# Patient Record
Sex: MALE | Race: OTHER | Employment: FULL TIME | URBAN - METROPOLITAN AREA
[De-identification: names, ages, dates, MRNs, and addresses within clinical notes are randomized per-mention and may not be internally consistent; named-entity substitution may affect disease eponyms.]

---

## 2017-07-26 ENCOUNTER — HOSPITAL ENCOUNTER (OUTPATIENT)
Dept: RADIOLOGY | Facility: HOSPITAL | Age: 47
Discharge: HOME | End: 2017-07-26
Payer: COMMERCIAL

## 2017-07-26 ENCOUNTER — TRANSCRIBE ORDERS (OUTPATIENT)
Dept: ADMINISTRATIVE | Facility: HOSPITAL | Age: 47
End: 2017-07-26

## 2017-07-26 DIAGNOSIS — Z11.1 SCREENING EXAMINATION FOR PULMONARY TUBERCULOSIS: ICD-10-CM

## 2017-07-26 DIAGNOSIS — Z11.1 SCREENING EXAMINATION FOR PULMONARY TUBERCULOSIS: Primary | ICD-10-CM

## 2017-07-26 PROCEDURE — 71020 HB CHEST X-RAY 2VW FRONTAL&LATL: CPT

## 2020-11-09 ENCOUNTER — TELEMEDICINE (OUTPATIENT)
Dept: FAMILY MEDICINE CLINIC | Facility: CLINIC | Age: 50
End: 2020-11-09

## 2020-11-09 ENCOUNTER — TELEPHONE (OUTPATIENT)
Dept: FAMILY MEDICINE CLINIC | Facility: CLINIC | Age: 50
End: 2020-11-09

## 2020-11-09 VITALS — BODY MASS INDEX: 25.76 KG/M2 | WEIGHT: 170 LBS | HEIGHT: 68 IN

## 2020-11-09 DIAGNOSIS — J06.9 UPPER RESPIRATORY TRACT INFECTION, UNSPECIFIED TYPE: Primary | ICD-10-CM

## 2020-11-09 DIAGNOSIS — R50.81 FEVER IN OTHER DISEASES: ICD-10-CM

## 2020-11-09 DIAGNOSIS — R05.8 COUGH PRODUCTIVE OF PURULENT SPUTUM: ICD-10-CM

## 2020-11-09 PROCEDURE — 99213 OFFICE O/P EST LOW 20 MIN: CPT | Performed by: NURSE PRACTITIONER

## 2020-11-09 RX ORDER — LISINOPRIL 5 MG/1
5 TABLET ORAL DAILY
COMMUNITY

## 2020-11-09 RX ORDER — PREDNISONE 20 MG/1
20 TABLET ORAL DAILY
Qty: 5 TABLET | Refills: 0 | Status: SHIPPED | OUTPATIENT
Start: 2020-11-09 | End: 2020-11-14

## 2020-11-09 RX ORDER — GLIMEPIRIDE 4 MG/1
4 TABLET ORAL
COMMUNITY

## 2020-11-09 RX ORDER — AZITHROMYCIN 250 MG/1
TABLET, FILM COATED ORAL
Qty: 6 TABLET | Refills: 0 | Status: SHIPPED | OUTPATIENT
Start: 2020-11-09 | End: 2020-11-14

## 2020-11-10 PROBLEM — J06.9 UPPER RESPIRATORY TRACT INFECTION: Status: ACTIVE | Noted: 2020-11-10

## 2020-11-10 PROBLEM — R50.9 FEVER: Status: ACTIVE | Noted: 2020-11-10

## 2020-11-10 PROBLEM — R05.8 COUGH PRODUCTIVE OF PURULENT SPUTUM: Status: ACTIVE | Noted: 2020-11-10

## 2020-11-12 ENCOUNTER — TELEMEDICINE (OUTPATIENT)
Dept: FAMILY MEDICINE CLINIC | Facility: CLINIC | Age: 50
End: 2020-11-12
Payer: COMMERCIAL

## 2020-11-12 VITALS — WEIGHT: 170 LBS | HEIGHT: 68 IN | BODY MASS INDEX: 25.76 KG/M2

## 2020-11-12 DIAGNOSIS — U07.1 LAB TEST POSITIVE FOR DETECTION OF COVID-19 VIRUS: Primary | ICD-10-CM

## 2020-11-12 PROCEDURE — 3725F SCREEN DEPRESSION PERFORMED: CPT | Performed by: NURSE PRACTITIONER

## 2020-11-12 PROCEDURE — 3008F BODY MASS INDEX DOCD: CPT | Performed by: NURSE PRACTITIONER

## 2020-11-12 PROCEDURE — 1036F TOBACCO NON-USER: CPT | Performed by: NURSE PRACTITIONER

## 2020-11-12 PROCEDURE — 99213 OFFICE O/P EST LOW 20 MIN: CPT | Performed by: NURSE PRACTITIONER

## 2020-11-30 ENCOUNTER — TELEPHONE (OUTPATIENT)
Dept: FAMILY MEDICINE CLINIC | Facility: CLINIC | Age: 50
End: 2020-11-30

## 2021-05-23 ENCOUNTER — IMMUNIZATIONS (OUTPATIENT)
Dept: FAMILY MEDICINE CLINIC | Facility: HOSPITAL | Age: 51
End: 2021-05-23

## 2021-05-23 DIAGNOSIS — Z23 ENCOUNTER FOR IMMUNIZATION: Primary | ICD-10-CM

## 2021-05-23 PROCEDURE — 0001A SARS-COV-2 / COVID-19 MRNA VACCINE (PFIZER-BIONTECH) 30 MCG: CPT

## 2021-05-23 PROCEDURE — 91300 SARS-COV-2 / COVID-19 MRNA VACCINE (PFIZER-BIONTECH) 30 MCG: CPT

## 2021-06-01 ENCOUNTER — OFFICE VISIT (OUTPATIENT)
Dept: DENTISTRY | Facility: CLINIC | Age: 51
End: 2021-06-01

## 2021-06-01 VITALS
DIASTOLIC BLOOD PRESSURE: 91 MMHG | WEIGHT: 171 LBS | TEMPERATURE: 97.7 F | BODY MASS INDEX: 26 KG/M2 | HEART RATE: 74 BPM | SYSTOLIC BLOOD PRESSURE: 164 MMHG

## 2021-06-01 DIAGNOSIS — K01.1 TOOTH IMPACTION: ICD-10-CM

## 2021-06-01 DIAGNOSIS — Z01.20 ENCOUNTER FOR DENTAL EXAMINATION: Primary | ICD-10-CM

## 2021-06-01 PROCEDURE — D0274 BITEWINGS - 4 RADIOGRAPHIC IMAGES: HCPCS

## 2021-06-01 PROCEDURE — D0150 COMPREHENSIVE ORAL EVALUATION - NEW OR ESTABLISHED PATIENT: HCPCS | Performed by: DENTIST

## 2021-06-01 PROCEDURE — D1110 PROPHYLAXIS - ADULT: HCPCS

## 2021-06-01 NOTE — PROGRESS NOTES
Patient presents for Comp Exam    Medical history updated in patient electronic medical record-  Parent denies any recent exposures for the family to coronavirus positive individuals, negative fever, negative sore throat, negative coughing, negative loss of taste or smell, no diarrhea or GI issues reported  High speed evacuation, N95 masks, face shield use, and other preventative measures utilized to prevent the spread of COVID-19  Pt hand sanitized, used Chlorhexidine pre procedural rinse, used RELEAF, external suction and High speed vacuum for polishing  RECALL EXAM, ADULT PROPHY,  4 BWX     CHIEF CONCERN: pt reports no pain today but pain previously in LR + LL areas  Pt indicating wisdom teeth area ( #17/32)  PAIN SCALE: 0  ASA CLASS: I  PLAQUE: moderate  CALCULUS: mod calculus  BLEEDING: heavy  STAIN : light   ORAL HYGIENE:  poor  PERIO: spot probed  Most 2-3 mm  Isolated 4 mm present  Hand scaled, polished and flossed  Used cavitron    Oral Hygiene Instruction :  recommended brushing 2 x daily for 2 minutes MIN, recommended flossing daily, reviewed dietary precautions  soft tissue evaluation  Stressed importance of 6 month recall  Informed pt that gingivitis present and gums in poor condition today  Stressed importance of daily interproximal cleaning  Soft tissue exam:  soft tissue exam was normal  ExtraOral exam:   Extraoral exam was normal    Dr Yoshi Dukes exam=   Reviewed with patient clinical and radiographic findings and parent verbalized understanding  All questions and concerns addressed   recommended referral to [facility] for extraction #17/32  Both teeth are horizontally impacted with #32 showing previous infection and #17 contributing to decay on #18    REFERRALS: [facility] referral provided for ext #17 + 32    CARIES FINDINGS: #18-DO ( unable to access until #17 removed)    Next Visit: #18 -DO after #17 extracted by [facility]  Next Recall: 6 month recall- recheck probings * watch gums*

## 2021-06-12 ENCOUNTER — IMMUNIZATIONS (OUTPATIENT)
Dept: FAMILY MEDICINE CLINIC | Facility: HOSPITAL | Age: 51
End: 2021-06-12

## 2021-06-12 DIAGNOSIS — Z23 ENCOUNTER FOR IMMUNIZATION: Primary | ICD-10-CM

## 2021-06-12 PROCEDURE — 0002A SARS-COV-2 / COVID-19 MRNA VACCINE (PFIZER-BIONTECH) 30 MCG: CPT

## 2021-06-12 PROCEDURE — 91300 SARS-COV-2 / COVID-19 MRNA VACCINE (PFIZER-BIONTECH) 30 MCG: CPT

## 2021-12-06 ENCOUNTER — CLINICAL SUPPORT (OUTPATIENT)
Dept: DENTISTRY | Facility: CLINIC | Age: 51
End: 2021-12-06

## 2021-12-06 VITALS — TEMPERATURE: 97.9 F | HEART RATE: 82 BPM | DIASTOLIC BLOOD PRESSURE: 90 MMHG | SYSTOLIC BLOOD PRESSURE: 157 MMHG

## 2021-12-06 DIAGNOSIS — Z01.20 ENCOUNTER FOR DENTAL EXAMINATION: Primary | ICD-10-CM

## 2021-12-06 PROCEDURE — D0120 PERIODIC ORAL EVALUATION - ESTABLISHED PATIENT: HCPCS | Performed by: DENTIST

## 2021-12-06 PROCEDURE — D0220 INTRAORAL - PERIAPICAL FIRST RADIOGRAPHIC IMAGE: HCPCS

## 2022-04-08 ENCOUNTER — NURSE TRIAGE (OUTPATIENT)
Dept: OTHER | Facility: OTHER | Age: 52
End: 2022-04-08

## 2022-04-08 NOTE — TELEPHONE ENCOUNTER
Regarding: Teeth extracted mouth swollen and in pain  ----- Message from Tim Calvillo sent at 4/8/2022  3:12 PM EDT -----  '' I had my wisdom teeth extracted on 3/25 on the right side of my mouth is swollen, I'm having a lot of pain  ''

## 2022-04-08 NOTE — TELEPHONE ENCOUNTER
Reason for Disposition   [1] Tooth extraction > 3 days ago AND [2] pain not improving    Answer Assessment - Initial Assessment Questions  1  SYMPTOM: "What's the main symptom you're concerned about?" (e g , bleeding, pain, fever)      Pain on right bottom extraction site  Unable to open mouth wide or chew on right side  2  ONSET: "When did the s/s  start?"      3/25    3  DATE of TOOTH EXTRACTION: "When was surgery performed?"       3/25  Pt prescribed amoxicillin post op and has completed course  4  BLEEDING: "Is there any bleeding?" If Yes, ask: "How much?"       Denies    5  PAIN: "Is there any pain?" If Yes, ask: "How bad is it?"  (Scale 1-10; or mild, moderate, severe)      5, motrin 600mg helps but pain is never gone  6  FEVER: "Do you have a fever?" If Yes, ask: "What is your temperature, how was it measured, and when did it start?"      Denies    7   OTHER SYMPTOMS: "Do you have any other symptoms?" (e g , face swelling)      Denies    Protocols used: TOOTH EXTRACTION-ADULT-

## 2022-05-17 NOTE — PROGRESS NOTES
Adult prophy   Pt presents for sc/rp - pt previously tx planned for sc/rp all 4 quads  Pt had several questions re: sc/rp which took up 30 minutes of scheduled time  Discussed insurance 80% covers sc/rp quads and pt responsibility 20%  Pt's portion today was to be 50 66  After discussion, pt requests only prophy today  Explained that if sc/rp not performed, perio condition will progress and future bone loss/tooth loss  Pt is not a smoker but is diabetic  Explained correlation with periodontal disease  Also explained if prophy done today, insurance may not cover sc/rp in future  Pt requests only prophy today  Performed prophy today per pt request and Pt signature on perio sc/rp refusal form  Refusal form signed by patient and  to scan to pt's chart  CHIEF CONCERN: none   PAIN SCALE: 0  ASA CLASS: I  PLAQUE: moderate  CALCULUS: heavy  BLEEDING: heavy-informed pt this is a sign that gums unhealthy and infection present  STAIN :light   ORAL HYGIENE:  poor -stressed importance of daily flossing  Pt reports he is flossing sometimes  PERIO: early perio present  Previous visit full probing completed with deeper pockets present  Hand scaled, polished and flossed  Used cavitron    Oral Hygiene Instruction:  recommended brushing 2 x daily for 2 minutes MIN, recommended flossing daily, reviewed dietary precautions  Informed pt no spitting or heavy lifting remainder of day due to excessive bleeding today  Soft tissue exam:  soft tissue exam was normal  ExtraOral exam:   Extraoral exam was normal    REFERRALS: no referrals needed    Next Visit: filling   NV: 6 month recall- re evaluate gums

## 2022-05-18 ENCOUNTER — OFFICE VISIT (OUTPATIENT)
Dept: DENTISTRY | Facility: CLINIC | Age: 52
End: 2022-05-18

## 2022-05-18 VITALS — TEMPERATURE: 97.8 F | HEART RATE: 97 BPM | DIASTOLIC BLOOD PRESSURE: 84 MMHG | SYSTOLIC BLOOD PRESSURE: 147 MMHG

## 2022-05-18 DIAGNOSIS — Z01.20 ENCOUNTER FOR DENTAL EXAMINATION: Primary | ICD-10-CM

## 2022-05-18 PROCEDURE — D1110 PROPHYLAXIS - ADULT: HCPCS

## 2022-12-19 ENCOUNTER — OFFICE VISIT (OUTPATIENT)
Dept: DENTISTRY | Facility: CLINIC | Age: 52
End: 2022-12-19

## 2022-12-19 VITALS — SYSTOLIC BLOOD PRESSURE: 169 MMHG | DIASTOLIC BLOOD PRESSURE: 91 MMHG

## 2022-12-19 DIAGNOSIS — Z01.20 ENCOUNTER FOR DENTAL EXAM AND CLEANING W/O ABNORMAL FINDINGS: Primary | ICD-10-CM

## 2022-12-19 NOTE — PROGRESS NOTES
RECALL EXAM, 4 bwx, probe exam    Pt's last dental visit was 5/18/22  Sc/rp was recommended but refused by patient  REVIEWED MED HX: meds, allergies, health changes reviewed in EPIC  CHIEF CONCERN:  none   PAIN SCALE:  0  ASA CLASS:  II  PERIO: full probe exam completed by Dr Gaby Jameson and Tactile Intraoral/ Extraoral evaluation: Oral and Oropharyngeal cancer evaluation  No findings     Dr Jayson Pope exam=   Reviewed with patient clinical and radiographic findings and patient verbalized understanding  All questions and concerns addressed  Dr Jayson Pope informed pt the periodontal disease is present and although it is not severe at this point, if recommended sc/rp is not completed, bone loss will progress and patient has the potential to lose teeth  Dr also discussed periodontal disease correlation with his Diabetes  Dr discussed sliding fee scale  Pt reports insurance will cover, it is just difficult to get an appt with his work schedule  Discussed later appts with our other 2 facilities  REFERRALS: no referrals needed- pt reports both LR + LL wisdom teeth removed  CARIES FINDINGS: #19 -DO       TREATMENT  PLAN :   1) sc/rp UR/ LR--->70-75 minutes requested  Pt requests only 2 visits to complete due to difficult work schedule  2) sc/rp UL/LL---> 70-75 minutes requested     3) #19 DO    Next Recall: 6 month recall     Last BWX: 12/19/22

## 2023-11-30 ENCOUNTER — OFFICE VISIT (OUTPATIENT)
Dept: DENTISTRY | Facility: CLINIC | Age: 53
End: 2023-11-30

## 2023-11-30 VITALS — SYSTOLIC BLOOD PRESSURE: 137 MMHG | DIASTOLIC BLOOD PRESSURE: 95 MMHG

## 2023-11-30 DIAGNOSIS — K05.4 PERIODONTOSIS: Primary | ICD-10-CM

## 2023-11-30 PROCEDURE — D4341 PERIODONTAL SCALING AND ROOT PLANING - 4 OR MORE TEETH PER QUADRANT: HCPCS

## 2023-11-30 NOTE — DENTAL PROCEDURE DETAILS
Pt arrives first time to [facility] since 12/19/22 for SRP apt. Requested two quads done today since insurance approval stops 12/23. We were able to accommodate him today  Reviewed Medical History  ASA II      Scaling and Root Planing: UR,LR  Applied topical gel 20% Benzocaine  Administered 1.5 carpules Septocaine short UR, LR 1.7mL Infiltrated quadrant & Lidocaine IANB long LR negative aspiration  Pt. tolerated well  Hand and ultrasonic Scaled and root planned  Post subgingival irrigation with Chlorhexidine  Post op instructions given no chewing, nothing hot until numbness subsides and no heavy lifting or frequent bending over next 24 hrs due to heavy bldg. Pt understands instructions. Electric tb strongly recommended  Heavy general. Supra and subgingival plaque, bldg. And calculus is present    NV: SRP UL full quad  SRP LL full quad-told pt may not have time to do both quads one visit  Needs: still needs rest done  Periodic exam due -decided with resident Kitty Rivas that we will do periodic exam after 12/20/23 when xrays are done/due. Liberty Carrera, WEN., PHDHP.

## 2023-12-24 ENCOUNTER — OFFICE VISIT (OUTPATIENT)
Dept: URGENT CARE | Facility: CLINIC | Age: 53
End: 2023-12-24
Payer: COMMERCIAL

## 2023-12-24 VITALS
OXYGEN SATURATION: 97 % | RESPIRATION RATE: 16 BRPM | HEART RATE: 105 BPM | BODY MASS INDEX: 26.52 KG/M2 | SYSTOLIC BLOOD PRESSURE: 133 MMHG | HEIGHT: 68 IN | DIASTOLIC BLOOD PRESSURE: 76 MMHG | WEIGHT: 175 LBS | TEMPERATURE: 99.2 F

## 2023-12-24 DIAGNOSIS — J06.9 ACUTE URI: Primary | ICD-10-CM

## 2023-12-24 PROCEDURE — 99213 OFFICE O/P EST LOW 20 MIN: CPT | Performed by: NURSE PRACTITIONER

## 2023-12-24 RX ORDER — AMOXICILLIN AND CLAVULANATE POTASSIUM 875; 125 MG/1; MG/1
1 TABLET, FILM COATED ORAL EVERY 12 HOURS SCHEDULED
Qty: 20 TABLET | Refills: 0 | Status: SHIPPED | OUTPATIENT
Start: 2023-12-24 | End: 2024-01-03

## 2023-12-24 RX ORDER — ALBUTEROL SULFATE 90 UG/1
2 AEROSOL, METERED RESPIRATORY (INHALATION) EVERY 6 HOURS PRN
Qty: 8.5 G | Refills: 0 | Status: SHIPPED | OUTPATIENT
Start: 2023-12-24

## 2023-12-24 NOTE — PROGRESS NOTES
[facility]        NAME: Calderon Cruz is a 53 y.o. male  : 1970    MRN: [id number]  DATE: 2023  TIME: 1:42 PM    Assessment and Plan   Acute URI [J06.9]  1. Acute URI  amoxicillin-clavulanate (AUGMENTIN) 875-125 mg per tablet    albuterol (ProAir HFA) 90 mcg/act inhaler            Patient Instructions       Follow up with PCP in 3-5 days.  Proceed to  ER if symptoms worsen.    Chief Complaint     Chief Complaint   Patient presents with    URI     URI s/s x 5 days with cough and congestion with fever.          History of Present Illness       Patient is a 53-year-old male presenting with 5 days of cough and fever.  Cough is moist and productive with sputum.  Fever resolved last night and has not returned.  Denies sore throat or ear pain.  He is using Advil, Tylenol, NyQuil, and Robitussin with no improvement.  No home COVID testing completed.  He states that a friend was staying at their house who is also sick.    URI   Associated symptoms include coughing and wheezing. Pertinent negatives include no abdominal pain, congestion, diarrhea, ear pain, headaches, nausea, rash, rhinorrhea, sinus pain, sore throat or vomiting.       Review of Systems   Review of Systems   Constitutional:  Positive for fever. Negative for activity change, chills and fatigue.   HENT:  Negative for congestion, ear pain, rhinorrhea, sinus pain and sore throat.    Respiratory:  Positive for cough, shortness of breath and wheezing.    Gastrointestinal:  Negative for abdominal pain, diarrhea, nausea and vomiting.   Musculoskeletal:  Negative for myalgias.   Skin:  Negative for rash.   Neurological:  Negative for headaches.         Current Medications       Current Outpatient Medications:     albuterol (ProAir HFA) 90 mcg/act inhaler, Inhale 2 puffs every 6 (six) hours as needed for wheezing, Disp: 8.5 g, Rfl: 0    amoxicillin-clavulanate (AUGMENTIN) 875-125 mg per tablet, Take 1 tablet by mouth every 12 (twelve)  "hours for 10 days, Disp: 20 tablet, Rfl: 0    glimepiride (AMARYL) 4 mg tablet, Take 4 mg by mouth every morning before breakfast, Disp: , Rfl:     lisinopril (ZESTRIL) 5 mg tablet, Take 5 mg by mouth daily, Disp: , Rfl:     metFORMIN (GLUCOPHAGE) 500 mg tablet, Take 500 mg by mouth 2 (two) times a day with meals, Disp: , Rfl:     Current Allergies     Allergies as of 12/24/2023    (No Known Allergies)            The following portions of the patient's history were reviewed and updated as appropriate: allergies, current medications, past family history, past medical history, past social history, past surgical history and problem list.     Past Medical History:   Diagnosis Date    Diabetes mellitus (HCC)     Hypertension        Past Surgical History:   Procedure Laterality Date    WISDOM TOOTH EXTRACTION         Family History   Problem Relation Age of Onset    Diabetes Mother          Medications have been verified.        Objective   /76   Pulse 105   Temp 99.2 °F (37.3 °C)   Resp 16   Ht 5' 8\" (1.727 m)   Wt 79.4 kg (175 lb)   SpO2 97%   BMI 26.61 kg/m²        Physical Exam     Physical Exam  Vitals reviewed.   Constitutional:       General: He is awake. He is not in acute distress.     Appearance: Normal appearance. He is normal weight.   HENT:      Head: Normocephalic.      Right Ear: Hearing, tympanic membrane, ear canal and external ear normal.      Left Ear: Hearing, tympanic membrane, ear canal and external ear normal.      Nose: Nose normal.      Mouth/Throat:      Lips: Pink.      Pharynx: Oropharynx is clear.   Cardiovascular:      Rate and Rhythm: Regular rhythm. Tachycardia present.      Heart sounds: Normal heart sounds, S1 normal and S2 normal.   Pulmonary:      Effort: Pulmonary effort is normal.      Breath sounds: Examination of the left-upper field reveals rhonchi. Examination of the left-middle field reveals rhonchi. Rhonchi present. No decreased breath sounds, wheezing or rales. "   Skin:     General: Skin is warm and moist.   Neurological:      General: No focal deficit present.      Mental Status: He is alert, oriented to person, place, and time and easily aroused.   Psychiatric:         Behavior: Behavior is cooperative.

## 2024-01-30 ENCOUNTER — OFFICE VISIT (OUTPATIENT)
Dept: DENTISTRY | Facility: CLINIC | Age: 54
End: 2024-01-30

## 2024-01-30 VITALS — SYSTOLIC BLOOD PRESSURE: 150 MMHG | HEART RATE: 80 BPM | DIASTOLIC BLOOD PRESSURE: 90 MMHG

## 2024-01-30 DIAGNOSIS — K05.30 PERIODONTITIS: Primary | ICD-10-CM

## 2024-01-30 PROCEDURE — D4341 PERIODONTAL SCALING AND ROOT PLANING - 4 OR MORE TEETH PER QUADRANT: HCPCS

## 2024-01-30 NOTE — PROGRESS NOTES
SRP in quads :UL/LL     CC: Patient notices movement in #20 recently. #20 Grade 1 MOB w/ lingual tilt and deeper pocketing.     Reviewed meds/hhx in Epic  ASA: 2. BP higher than normal. Patient states he  forgot to take his bp pill this morning and was rushing to make this appointment.     Anesthesia-  Topical gel benzocaine 20% was applied to tissue.  2 carps 4% Septo w/ epi 1:100,000 was given via short needle.  Type of injection:buccal infiltration  Neg aspirations and no complications for all.     Cavitron and hand instruments used  Bleeding: heavy  Supra/sub calculus was removed from tooth surfaces  Irrigated w/ .12% Chlorhexidine Gluconate    OHI: reviewed with the patient the need to maintain proper oral hygiene regimen at home. Brushing 2x/day for 2 minutes, flossing subgingivally daily and mouthrinse 1-2x/day for 60 seconds. Recommend otc pain reliever when the patient gets home prior to local anesthesia wearing off. Recommend warm salt water rinses. Recommend excellent homecare regimen especially on areas post SRP.    Advised patient periodontal disease is an oral disease we can treat and maintain but cannot cure. Without proper dental visits and proper at home dental care this disease may return. Patient understands.     Following all scaling and root planing, pt will return for 12 weeks perio maint. If healing is sufficient, the patient will then continue on 3 month periodontal maintenance appointments. If healing is insufficient, pt may need to be assessed for gingival flap surgery.      NV: 3mos Perio Mt, Ex, 4BWX

## 2024-02-21 PROBLEM — R05.8 COUGH PRODUCTIVE OF PURULENT SPUTUM: Status: RESOLVED | Noted: 2020-11-10 | Resolved: 2024-02-21

## 2024-02-21 PROBLEM — R50.9 FEVER: Status: RESOLVED | Noted: 2020-11-10 | Resolved: 2024-02-21

## 2024-04-27 ENCOUNTER — OFFICE VISIT (OUTPATIENT)
Dept: URGENT CARE | Facility: CLINIC | Age: 54
End: 2024-04-27
Payer: COMMERCIAL

## 2024-04-27 VITALS
DIASTOLIC BLOOD PRESSURE: 80 MMHG | OXYGEN SATURATION: 97 % | RESPIRATION RATE: 16 BRPM | SYSTOLIC BLOOD PRESSURE: 149 MMHG | TEMPERATURE: 98.1 F | HEART RATE: 84 BPM

## 2024-04-27 DIAGNOSIS — R05.1 ACUTE COUGH: ICD-10-CM

## 2024-04-27 DIAGNOSIS — R09.82 POSTNASAL DRIP: Primary | ICD-10-CM

## 2024-04-27 PROCEDURE — 99214 OFFICE O/P EST MOD 30 MIN: CPT | Performed by: PHYSICIAN ASSISTANT

## 2024-04-27 RX ORDER — CETIRIZINE HYDROCHLORIDE 10 MG/1
10 TABLET ORAL DAILY
Qty: 30 TABLET | Refills: 0 | Status: SHIPPED | OUTPATIENT
Start: 2024-04-27 | End: 2024-05-27

## 2024-04-27 RX ORDER — FLUTICASONE PROPIONATE 50 MCG
1 SPRAY, SUSPENSION (ML) NASAL DAILY
Qty: 9.9 ML | Refills: 0 | Status: SHIPPED | OUTPATIENT
Start: 2024-04-27

## 2024-04-27 NOTE — PROGRESS NOTES
[facility]        NAME: Calderon Cruz is a 54 y.o. male  : 1970    MRN: [id number]  DATE: 2024  TIME: 12:55 PM    Assessment and Plan   Postnasal drip [R09.82]  1. Postnasal drip  fluticasone (FLONASE) 50 mcg/act nasal spray    cetirizine (ZyrTEC) 10 mg tablet      2. Acute cough  cetirizine (ZyrTEC) 10 mg tablet    dextromethorphan-guaifenesin (MUCINEX DM)  MG per 12 hr tablet      Presents with dry cough and concern for wheezing.  There is no auscultation.  Wheezing only present with forced expiration.  Patient is claiming throat frequently consistent with postnasal drip.  Recommend Flonase, Zyrtec, and Delsym.  Patient has requested prescriptions therefore will write for Mucinex DM.      Patient Instructions     Patient Instructions   Fluticasone (Flonase nasal spray) over the counter as directed on packaging.   Delsym or Coricidin HBP as needed for cough.   If symptoms are not improved 7-10 days after onset, follow-up with PCP.   If symptoms worsen or new symptoms develop, report to the emergency department immediately.     Follow up with PCP in 3-5 days.  Proceed to  ER if symptoms worsen.    Chief Complaint     Chief Complaint   Patient presents with    Cough     Pt presents with dry cough x couple of days. Denies fevers. Tried using Vicks with no relief.          History of Present Illness       54-year-old male presents with complaint of dry cough for 2 days duration.  Patient denies any runny nose, sinus congestion, and sore throat along with postnasal drip.  Is concerned as he feels like he has some wheezing.  He states that his wife had similar symptoms for approximately a week duration and saw her family doctor on Monday and was provided with my prescription for treatment.  He thinks he needs the same thing.    Cough  Associated symptoms include wheezing. Pertinent negatives include no chest pain, chills, fever, postnasal drip, rhinorrhea, sore throat or shortness of  breath.       Review of Systems   Review of Systems   Constitutional:  Negative for chills and fever.   HENT:  Negative for congestion, postnasal drip, rhinorrhea and sore throat.    Respiratory:  Positive for cough and wheezing. Negative for shortness of breath.    Cardiovascular:  Negative for chest pain.         Current Medications       Current Outpatient Medications:     cetirizine (ZyrTEC) 10 mg tablet, Take 1 tablet (10 mg total) by mouth daily, Disp: 30 tablet, Rfl: 0    dextromethorphan-guaifenesin (MUCINEX DM)  MG per 12 hr tablet, Take 1 tablet by mouth every 12 (twelve) hours, Disp: 30 tablet, Rfl: 0    fluticasone (FLONASE) 50 mcg/act nasal spray, 1 spray into each nostril daily, Disp: 9.9 mL, Rfl: 0    albuterol (ProAir HFA) 90 mcg/act inhaler, Inhale 2 puffs every 6 (six) hours as needed for wheezing, Disp: 8.5 g, Rfl: 0    glimepiride (AMARYL) 4 mg tablet, Take 4 mg by mouth every morning before breakfast, Disp: , Rfl:     lisinopril (ZESTRIL) 5 mg tablet, Take 5 mg by mouth daily, Disp: , Rfl:     metFORMIN (GLUCOPHAGE) 500 mg tablet, Take 500 mg by mouth 2 (two) times a day with meals, Disp: , Rfl:     Current Allergies     Allergies as of 04/27/2024    (No Known Allergies)            The following portions of the patient's history were reviewed and updated as appropriate: allergies, current medications, past family history, past medical history, past social history, past surgical history and problem list.     Past Medical History:   Diagnosis Date    Diabetes mellitus (HCC)     Hypertension        Past Surgical History:   Procedure Laterality Date    WISDOM TOOTH EXTRACTION         Family History   Problem Relation Age of Onset    Diabetes Mother          Medications have been verified.        Objective   /80   Pulse 84   Temp 98.1 °F (36.7 °C)   Resp 16   SpO2 97%   No LMP for male patient.       Physical Exam     Physical Exam  Vitals and nursing note reviewed.   Constitutional:        General: He is awake. He is not in acute distress.     Appearance: Normal appearance. He is well-developed and well-groomed. He is not ill-appearing, toxic-appearing or diaphoretic.      Comments: Patient clearing his throat frequently   HENT:      Head: Normocephalic and atraumatic.      Jaw: No trismus.      Right Ear: Hearing, tympanic membrane, ear canal and external ear normal. There is no impacted cerumen. No foreign body.      Left Ear: Hearing, tympanic membrane, ear canal and external ear normal. There is no impacted cerumen. No foreign body.      Nose: Mucosal edema present. No nasal deformity, congestion or rhinorrhea.      Right Nostril: No foreign body, epistaxis or occlusion.      Left Nostril: No foreign body, epistaxis or occlusion.      Right Turbinates: Not enlarged, swollen or pale.      Left Turbinates: Not enlarged, swollen or pale.      Mouth/Throat:      Lips: Pink. No lesions.      Mouth: Mucous membranes are moist. No injury, oral lesions or angioedema.      Dentition: Normal dentition.      Tongue: No lesions. Tongue does not deviate from midline.      Palate: No mass and lesions.      Pharynx: Uvula midline. No pharyngeal swelling, oropharyngeal exudate, posterior oropharyngeal erythema or uvula swelling.      Tonsils: No tonsillar exudate or tonsillar abscesses.      Comments: Postnasal drip present in the posterior oropharynx  Eyes:      General: Lids are normal. Vision grossly intact. Gaze aligned appropriately. No allergic shiner.     Extraocular Movements: Extraocular movements intact.   Cardiovascular:      Rate and Rhythm: Normal rate and regular rhythm.      Heart sounds: Normal heart sounds, S1 normal and S2 normal. Heart sounds not distant. No murmur heard.     No friction rub. No gallop.   Pulmonary:      Effort: Pulmonary effort is normal.      Breath sounds: Normal breath sounds.      Comments: Patient is speaking in full sentences with no increased respiratory effort. No  "audible stridor.  Expiratory wheezing with forced expiration only consistent with postnasal drip and transmitted upper respiratory sounds    Musculoskeletal:      Cervical back: Normal range of motion.   Lymphadenopathy:      Cervical: No cervical adenopathy.   Skin:     General: Skin is warm and dry.   Neurological:      Mental Status: He is alert and oriented to person, place, and time.      Coordination: Coordination is intact.      Gait: Gait is intact.   Psychiatric:         Attention and Perception: Attention and perception normal.         Mood and Affect: Mood and affect normal.         Speech: Speech normal.         Behavior: Behavior normal. Behavior is cooperative.               Note: Portions of this record may have been created with voice recognition software. Occasional wrong word or \"sound a like\" substitutions may have occurred due to the inherent limitations of voice recognition software. Please read the chart carefully and recognize, using context, where substitutions have occurred.*      "

## 2024-04-27 NOTE — PATIENT INSTRUCTIONS
Fluticasone (Flonase nasal spray) over the counter as directed on packaging.   Delsym or Coricidin HBP as needed for cough.   If symptoms are not improved 7-10 days after onset, follow-up with PCP.   If symptoms worsen or new symptoms develop, report to the emergency department immediately.

## 2024-06-24 ENCOUNTER — OFFICE VISIT (OUTPATIENT)
Dept: DENTISTRY | Facility: CLINIC | Age: 54
End: 2024-06-24

## 2024-06-24 VITALS — SYSTOLIC BLOOD PRESSURE: 169 MMHG | HEART RATE: 73 BPM | DIASTOLIC BLOOD PRESSURE: 91 MMHG

## 2024-06-24 DIAGNOSIS — Z01.20 ENCOUNTER FOR DENTAL EXAM AND CLEANING W/O ABNORMAL FINDINGS: Primary | ICD-10-CM

## 2024-06-24 NOTE — DENTAL PROCEDURE DETAILS
Periodic Exam, 3 Month Periodontal Maintenance , and 4 BWX, 1PA #20   Pt reports that he has to leave withing 30-45min from start of appointment. Unable to to full mouth probe due to time.  REVIEWED MED HX: meds, allergies, health changes reviewed in EPIC  CHIEF CONCERN: pt reports #20 slightly loose.   PAIN SCALE:  0  ASA CLASS:  ASA 1 - Normal health patient  PLAQUE:  moderate  CALCULUS:  Moderate  BLEEDING:  moderate  STAIN :   Light  PERIO: 3/A    Hygiene Procedures: Scaled, Polished, Flossed and Used Cavitron    Oral Hygiene Instruction: Brushing Minimum 2x daily for 2 minutes, daily flossing. Disp: toothbrush, toothpaste, floss    Visual and Tactile Intraoral/ Extraoral evaluation: Oral and Oropharyngeal cancer evaluation. No findings     REFERRALS: none    EXAM: Dr. Garcia    CARIES FINDINGS: 18-MO, RCT/build up/core/crown  Dr Garcia explained that #20 PAP present and slight mobility would require rct/core/crown in order to save the tooth or only other option would be to extract and then replace missing space. Pt would like to think about decision. Offered to submit for pre authorization. Pt declines at this time. No pre auth sent per patient.     Next Recall: 3 month perio maintenance/periodic exam    Last bwx: 6/24/24  Last Panorex/ FMX : 8/2019

## 2024-06-24 NOTE — PROGRESS NOTES
Periodic Exam, 3 Month Periodontal Maintenance, and 4 BWX     REVIEWED MED HX: meds, allergies, health changes reviewed in EPIC  CHIEF CONCERN:  none   PAIN SCALE:  0  ASA CLASS:  ASA 1 - Normal health patient  PLAQUE:  {Plaque Level:18314}  CALCULUS:  {Calculus:27809}  BLEEDING:  {Bleedin}  STAIN :   {Stain:}  PERIO:     Hygiene Procedures: Scaled, Polished, Flossed and Used Cavitron    Oral Hygiene Instruction: Brushing Minimum 2x daily for 2 minutes, daily flossing    Visual and Tactile Intraoral/ Extraoral evaluation: Oral and Oropharyngeal cancer evaluation. No findings     REFERRALS: none    EXAM: {perio maintenance exam:44969}    CARIES FINDINGS:     Next Recall: 3 month perio maintenance/periodic exam    Last bwx: 24  Last Panorex/ FMX :

## 2024-11-11 ENCOUNTER — OFFICE VISIT (OUTPATIENT)
Dept: URGENT CARE | Facility: CLINIC | Age: 54
End: 2024-11-11
Payer: COMMERCIAL

## 2024-11-11 VITALS — OXYGEN SATURATION: 96 % | RESPIRATION RATE: 18 BRPM | HEART RATE: 116 BPM | TEMPERATURE: 97.7 F

## 2024-11-11 DIAGNOSIS — J06.9 VIRAL UPPER RESPIRATORY TRACT INFECTION: Primary | ICD-10-CM

## 2024-11-11 DIAGNOSIS — J20.9 ACUTE BRONCHITIS, UNSPECIFIED ORGANISM: ICD-10-CM

## 2024-11-11 PROCEDURE — 99213 OFFICE O/P EST LOW 20 MIN: CPT | Performed by: NURSE PRACTITIONER

## 2024-11-11 RX ORDER — AZITHROMYCIN 250 MG/1
TABLET, FILM COATED ORAL
Qty: 6 TABLET | Refills: 0 | Status: SHIPPED | OUTPATIENT
Start: 2024-11-11 | End: 2024-11-16

## 2024-11-11 NOTE — LETTER
November 11, 2024     Patient: Calderon Cruz   YOB: 1970   Date of Visit: 11/11/2024       To Whom it May Concern:    Calderon Cruz was seen in my clinic on 11/11/2024. Please excuse from work today and tomorrow due to illness. May return sooner if feeling better and no fever x 24 hours.     If you have any questions or concerns, please don't hesitate to call.         Sincerely,          GRETA Dwons        CC: No Recipients

## 2024-11-11 NOTE — PROGRESS NOTES
[facility]        NAME: Calderon Cruz is a 54 y.o. male  : 1970    MRN: [id number]  DATE: 2024  TIME: 12:28 PM    Assessment and Plan   Viral upper respiratory tract infection [J06.9]  1. Viral upper respiratory tract infection  guaifenesin-codeine (GUAIFENESIN AC) 100-10 MG/5ML liquid      2. Acute bronchitis, unspecified organism  azithromycin (ZITHROMAX) 250 mg tablet            Patient Instructions     Patient Instructions   --Rest, drink plenty of fluids  --Consider vitamin C, zinc, quercetin, and vitamin D to help strengthen your immune system  --Fill and start antibiotic if no improvement/worsening over the next 3-7 days.   --For cough, you can take an OTC expectorant such as plain Robitussion or Mucinex (active ingredient guaifenesin).  A spoonful of honey at bedtime may also be helpful, as may a prescription cough medicine.  Also recommended is the use of a cool mist humidifier (with or without Vicks) in the bedroom at night.   --For sore throat, you can take OTC lozenges, use warm gargles (salt water or apple cider vinegar and honey), herbal teas, or an OTC throat spray (Chloraseptic).  --For nasal/sinus congestion, helpful measures include steam, warm compresses, an OTC saline nasal spray or Neti pot, or an OTC decongestant (such as Sudafed).  The decongestant should be avoided, however, if you are under 6 years of age, or have a history of high blood pressure or heart disease.  In addition, an OTC nasal steroid (Flonase, Nasocort) or nasal decongestant (Afrin, Zac-synephrine) may be taken.  The nasal steroid should be used at bedtime, after the saline nasal spray. The nasal decongestant should not be taken more than 3 days consecutively in order to prevent rebound congestion.   --For nasal drainage, postnasal drip, sneezing and itching, an OTC antihistamine (Allegra, Benadryl, etc) can be taken.   --You can take Tylenol or Motrin/Advil as needed for fever, headache, body  aches. Motrin/Advil should be avoided, however, if you have a history of heart disease, bleeding ulcers, or if you take blood thinners.   --You should contact your primary care provider and/or go to the ER if your symptoms are not improved or get worse over the next 7 days.  This includes new onset fever, localized ear pain, sinus pain, as well as worsening cough, chest pain, shortness of breath, or significant weakness/fatigue.          If tests have been performed at [facility], our office will contact you with results if changes need to be made to the care plan discussed with you at the visit.  [facility]    Chief Complaint     Chief Complaint   Patient presents with    Cough     PT reports that he has a cough that started on Wed. He reports having fever Wed. He took OTC meds. Pt reports that on Sat and Sun he took steroids that he was given in May- he reports that he had SOB         History of Present Illness       Here with wife for complaints of cough productive of white sputum, sore throat x 4 days.   No fever, nasal congestion, rhinorrhea.  Some post nasal drip.   No fever/chills, headache, body aches, GI complaints.   No dyspnea, wheezing at present.    Took leftover prednisone--didn't help.    Numerous family members sick with URI symptoms recently. Negative COVID testing.    Taking Mucinex.         Review of Systems   Review of Systems   Constitutional:  Negative for fever.   HENT:  Positive for postnasal drip and sore throat. Negative for ear pain and rhinorrhea.    Respiratory:  Positive for cough.    Gastrointestinal:  Negative for abdominal pain, nausea and vomiting.   Musculoskeletal:  Negative for myalgias.   Neurological:  Negative for headaches.         Current Medications       Current Outpatient Medications:     azithromycin (ZITHROMAX) 250 mg tablet, Take 2 tablets today then 1 tablet daily x 4 days, Disp: 6 tablet, Rfl: 0    guaifenesin-codeine  (GUAIFENESIN AC) 100-10 MG/5ML liquid, Take 10 mL by mouth 3 (three) times a day as needed for cough TAKE 1-2 TSP BY  MOUTH EVERY 4-6 HOURS AS NEEDED FOR COUGH, Disp: 180 mL, Rfl: 0    albuterol (ProAir HFA) 90 mcg/act inhaler, Inhale 2 puffs every 6 (six) hours as needed for wheezing, Disp: 8.5 g, Rfl: 0    cetirizine (ZyrTEC) 10 mg tablet, Take 1 tablet (10 mg total) by mouth daily, Disp: 30 tablet, Rfl: 0    dextromethorphan-guaifenesin (MUCINEX DM)  MG per 12 hr tablet, Take 1 tablet by mouth every 12 (twelve) hours, Disp: 30 tablet, Rfl: 0    fluticasone (FLONASE) 50 mcg/act nasal spray, 1 spray into each nostril daily, Disp: 9.9 mL, Rfl: 0    glimepiride (AMARYL) 4 mg tablet, Take 4 mg by mouth every morning before breakfast, Disp: , Rfl:     lisinopril (ZESTRIL) 5 mg tablet, Take 5 mg by mouth daily, Disp: , Rfl:     metFORMIN (GLUCOPHAGE) 500 mg tablet, Take 500 mg by mouth 2 (two) times a day with meals, Disp: , Rfl:     Current Allergies     Allergies as of 11/11/2024    (No Known Allergies)            The following portions of the patient's history were reviewed and updated as appropriate: allergies, current medications, past family history, past medical history, past social history, past surgical history and problem list.     Past Medical History:   Diagnosis Date    Diabetes mellitus (HCC)     Hypertension        Past Surgical History:   Procedure Laterality Date    WISDOM TOOTH EXTRACTION         Family History   Problem Relation Age of Onset    Diabetes Mother          Medications have been verified.        Objective   Pulse (!) 116   Temp 97.7 °F (36.5 °C)   Resp 18   SpO2 96%   No LMP for male patient.       Physical Exam     Physical Exam  Constitutional:       General: He is not in acute distress.     Appearance: He is well-developed. He is not diaphoretic.   HENT:      Head: Normocephalic and atraumatic.      Right Ear: Tympanic membrane, ear canal and external ear normal.      Left Ear:  Tympanic membrane, ear canal and external ear normal.      Nose: Congestion and rhinorrhea present.      Mouth/Throat:      Pharynx: No oropharyngeal exudate or posterior oropharyngeal erythema.   Cardiovascular:      Rate and Rhythm: Normal rate and regular rhythm.      Heart sounds: Normal heart sounds.   Pulmonary:      Effort: Pulmonary effort is normal. No respiratory distress.      Breath sounds: Normal breath sounds. No stridor. No wheezing, rhonchi or rales.   Chest:      Chest wall: No tenderness.   Musculoskeletal:      Cervical back: Neck supple. No tenderness.   Lymphadenopathy:      Cervical: No cervical adenopathy.   Skin:     General: Skin is warm and dry.      Findings: No rash.   Neurological:      Mental Status: He is alert and oriented to person, place, and time.   Psychiatric:         Mood and Affect: Mood normal.

## 2024-11-11 NOTE — PATIENT INSTRUCTIONS
--Rest, drink plenty of fluids  --Consider vitamin C, zinc, quercetin, and vitamin D to help strengthen your immune system  --Fill and start antibiotic if no improvement/worsening over the next 3-7 days.   --For cough, you can take an OTC expectorant such as plain Robitussion or Mucinex (active ingredient guaifenesin).  A spoonful of honey at bedtime may also be helpful, as may a prescription cough medicine.  Also recommended is the use of a cool mist humidifier (with or without Vicks) in the bedroom at night.   --For sore throat, you can take OTC lozenges, use warm gargles (salt water or apple cider vinegar and honey), herbal teas, or an OTC throat spray (Chloraseptic).  --For nasal/sinus congestion, helpful measures include steam, warm compresses, an OTC saline nasal spray or Neti pot, or an OTC decongestant (such as Sudafed).  The decongestant should be avoided, however, if you are under 6 years of age, or have a history of high blood pressure or heart disease.  In addition, an OTC nasal steroid (Flonase, Nasocort) or nasal decongestant (Afrin, Zac-synephrine) may be taken.  The nasal steroid should be used at bedtime, after the saline nasal spray. The nasal decongestant should not be taken more than 3 days consecutively in order to prevent rebound congestion.   --For nasal drainage, postnasal drip, sneezing and itching, an OTC antihistamine (Allegra, Benadryl, etc) can be taken.   --You can take Tylenol or Motrin/Advil as needed for fever, headache, body aches. Motrin/Advil should be avoided, however, if you have a history of heart disease, bleeding ulcers, or if you take blood thinners.   --You should contact your primary care provider and/or go to the ER if your symptoms are not improved or get worse over the next 7 days.  This includes new onset fever, localized ear pain, sinus pain, as well as worsening cough, chest pain, shortness of breath, or significant weakness/fatigue.